# Patient Record
Sex: MALE | Race: WHITE
[De-identification: names, ages, dates, MRNs, and addresses within clinical notes are randomized per-mention and may not be internally consistent; named-entity substitution may affect disease eponyms.]

---

## 2019-07-25 ENCOUNTER — HOSPITAL ENCOUNTER (EMERGENCY)
Dept: HOSPITAL 72 - EMR | Age: 69
LOS: 1 days | Discharge: HOME | End: 2019-07-26
Payer: SELF-PAY

## 2019-07-25 VITALS — DIASTOLIC BLOOD PRESSURE: 83 MMHG | SYSTOLIC BLOOD PRESSURE: 119 MMHG

## 2019-07-25 VITALS — WEIGHT: 190 LBS | HEIGHT: 69 IN | BODY MASS INDEX: 28.14 KG/M2

## 2019-07-25 VITALS — SYSTOLIC BLOOD PRESSURE: 123 MMHG | DIASTOLIC BLOOD PRESSURE: 82 MMHG

## 2019-07-25 DIAGNOSIS — R07.89: Primary | ICD-10-CM

## 2019-07-25 DIAGNOSIS — E86.0: ICD-10-CM

## 2019-07-25 LAB
ADD MANUAL DIFF: NO
ALBUMIN SERPL-MCNC: 3.6 G/DL (ref 3.4–5)
ALBUMIN/GLOB SERPL: 0.9 {RATIO} (ref 1–2.7)
ALP SERPL-CCNC: 80 U/L (ref 46–116)
ALT SERPL-CCNC: 16 U/L (ref 12–78)
ANION GAP SERPL CALC-SCNC: 11 MMOL/L (ref 5–15)
AST SERPL-CCNC: 30 U/L (ref 15–37)
BASOPHILS NFR BLD AUTO: 1.1 % (ref 0–2)
BILIRUB SERPL-MCNC: 0.3 MG/DL (ref 0.2–1)
BUN SERPL-MCNC: 23 MG/DL (ref 7–18)
CALCIUM SERPL-MCNC: 8.9 MG/DL (ref 8.5–10.1)
CHLORIDE SERPL-SCNC: 107 MMOL/L (ref 98–107)
CK MB SERPL-MCNC: 2.9 NG/ML (ref 0–3.6)
CK SERPL-CCNC: 370 U/L (ref 26–308)
CO2 SERPL-SCNC: 22 MMOL/L (ref 21–32)
CREAT SERPL-MCNC: 1.2 MG/DL (ref 0.55–1.3)
EOSINOPHIL NFR BLD AUTO: 3.3 % (ref 0–3)
ERYTHROCYTE [DISTWIDTH] IN BLOOD BY AUTOMATED COUNT: 12.1 % (ref 11.6–14.8)
GLOBULIN SER-MCNC: 4 G/DL
HCT VFR BLD CALC: 33.1 % (ref 42–52)
HGB BLD-MCNC: 10.8 G/DL (ref 14.2–18)
LYMPHOCYTES NFR BLD AUTO: 17.4 % (ref 20–45)
MCV RBC AUTO: 86 FL (ref 80–99)
MONOCYTES NFR BLD AUTO: 8.7 % (ref 1–10)
NEUTROPHILS NFR BLD AUTO: 69.6 % (ref 45–75)
PLATELET # BLD: 242 K/UL (ref 150–450)
POTASSIUM SERPL-SCNC: 4.3 MMOL/L (ref 3.5–5.1)
RBC # BLD AUTO: 3.83 M/UL (ref 4.7–6.1)
SODIUM SERPL-SCNC: 140 MMOL/L (ref 136–145)
WBC # BLD AUTO: 7.2 K/UL (ref 4.8–10.8)

## 2019-07-25 PROCEDURE — 85025 COMPLETE CBC W/AUTO DIFF WBC: CPT

## 2019-07-25 PROCEDURE — 84484 ASSAY OF TROPONIN QUANT: CPT

## 2019-07-25 PROCEDURE — 36415 COLL VENOUS BLD VENIPUNCTURE: CPT

## 2019-07-25 PROCEDURE — 71045 X-RAY EXAM CHEST 1 VIEW: CPT

## 2019-07-25 PROCEDURE — 93005 ELECTROCARDIOGRAM TRACING: CPT

## 2019-07-25 PROCEDURE — 82550 ASSAY OF CK (CPK): CPT

## 2019-07-25 PROCEDURE — 85379 FIBRIN DEGRADATION QUANT: CPT

## 2019-07-25 PROCEDURE — 82553 CREATINE MB FRACTION: CPT

## 2019-07-25 PROCEDURE — 83690 ASSAY OF LIPASE: CPT

## 2019-07-25 PROCEDURE — 80307 DRUG TEST PRSMV CHEM ANLYZR: CPT

## 2019-07-25 PROCEDURE — 80053 COMPREHEN METABOLIC PANEL: CPT

## 2019-07-25 PROCEDURE — 83880 ASSAY OF NATRIURETIC PEPTIDE: CPT

## 2019-07-25 PROCEDURE — 99284 EMERGENCY DEPT VISIT MOD MDM: CPT

## 2019-07-25 NOTE — NUR
ED Nurse Note:

Patient biba RA 62 from [facility], patient states that he was walking, and started 
experiencinng left sided chest pain 4/10 pain radiating to his left arm. 
patient was given 2 nitro and 162 aspirin via EMS. Pt is AO x 4times, VSS, on 
room air no distress. DEVIKAD seen Pt at bedside.

## 2019-07-25 NOTE — EMERGENCY ROOM REPORT
History of Present Illness


General


Chief Complaint:  Chest Pain


Source:  Patient





Present Illness


HPI


Is a 68-year-old male who presented after increased chest discomfort.  Patient 

reports having onset of symptoms while attempting to run toward a bus.  He 

states that he had onset of symptoms earlier in the day as well.  He denies any 

prior medical history.  He states that he had some relief after second 

nitroglycerin.  Patient had onset of symptoms intermittently in nature.Patient 

states that he runs approximately 5 miles per day.  He states he had some chest 

discomfort after running early in the day.


Allergies:  


Coded Allergies:  


     No Known Allergies (Unverified , 7/25/19)





Patient History


Past Medical History:  see triage record


Reviewed Nursing Documentation:  PMH: Agreed; PSxH: Agreed





Nursing Documentation-PMH


Past Medical History:  No Stated History





Review of Systems


All Other Systems:  negative except mentioned in HPI





Physical Exam





Vital Signs








  Date Time  Temp Pulse Resp B/P (MAP) Pulse Ox O2 Delivery O2 Flow Rate FiO2


 


7/25/19 19:18 98.2 79 18 120/74 (89) 95 Room Air  








Sp02 EP Interpretation:  reviewed, normal


General Appearance:  normal inspection, well appearing, no apparent distress, 

alert, GCS 15, obese


Head:  atraumatic


ENT:  normal ENT inspection, hearing grossly normal, normal voice, other - poor 

dentition


Neck:  normal inspection, full range of motion, supple, no bony tend


Respiratory:  normal inspection, lungs clear, normal breath sounds, no 

respiratory distress, no retraction, no wheezing


Cardiovascular #1:  regular rate, rhythm, no edema


Gastrointestinal:  normal inspection, normal bowel sounds, non tender, soft, no 

guarding, no hernia


Genitourinary:  no CVA tenderness


Musculoskeletal:  normal inspection, back normal, normal range of motion


Neurologic:  normal inspection, alert, oriented x3, responsive, CNs III-XII nml 

as tested, speech normal


Psychiatric:  normal inspection, judgement/insight normal, mood/affect normal





Medical Decision Making


Diagnostic Impression:  


 Primary Impression:  


 Chest pain


 Additional Impression:  


 Dehydration


ER Course


Patient presented for chest pain.  Differential diagnosis include was not 

limited to anemia, pneumonia, pneumothorax, myocardial infarction, among 

others.  Because of complexity of patient's case laboratory testing and imaging 

studies were ordered.  Patient was noted to have unremarkable EKG. patient's 

laboratory testing was unremarkable except for some elevation of his BUN/

creatinine because with dehydration.  Troponin was noted to be negative.  

Patient was noted to be in no acute distress and have no significant evidence 

of acute coronary syndrome at this time.  Patient was noted to have EKG leads 

likely from prior hospitalization which were present prior to EMS arrival.  

Patient's noted to have some significant number of shopping bags with him.  

Patient was noted to have no significant abnormalities on chest x-ray.  Patient 

will be discharged home.  Patient advised outpatient cardiology follow-up.  

Patient was advised to return if worse.





Labs








Test


  7/25/19


19:30 7/25/19


19:50 7/25/19


21:15


 


Sodium Level


  140 MMOL/L


(136-145) 


  


 


 


Potassium Level


  4.3 MMOL/L


(3.5-5.1) 


  


 


 


Chloride Level


  107 MMOL/L


() 


  


 


 


Carbon Dioxide Level


  22 MMOL/L


(21-32) 


  


 


 


Anion Gap


  11 mmol/L


(5-15) 


  


 


 


Blood Urea Nitrogen


  23 mg/dL


(7-18) 


  


 


 


Creatinine


  1.2 MG/DL


(0.55-1.30) 


  


 


 


Estimat Glomerular Filtration


Rate > 60 mL/min


(>60) 


  


 


 


Glucose Level


  112 MG/DL


() 


  


 


 


Calcium Level


  8.9 MG/DL


(8.5-10.1) 


  


 


 


Total Bilirubin


  0.3 MG/DL


(0.2-1.0) 


  


 


 


Aspartate Amino Transf


(AST/SGOT) 30 U/L (15-37) 


  


  


 


 


Alanine Aminotransferase


(ALT/SGPT) 16 U/L (12-78) 


  


  


 


 


Alkaline Phosphatase


  80 U/L


() 


  


 


 


Total Creatine Kinase


  370 U/L


() 


  


 


 


Creatine Kinase MB


  2.9 NG/ML


(0.0-3.6) 


  


 


 


Creatine Kinase MB Relative


Index 0.7 


  


  


 


 


Troponin I


  0.000 ng/mL


(0.000-0.056) 


  


 


 


Pro-B-Type Natriuretic Peptide


  45 pg/mL


(0-125) 


  


 


 


Total Protein


  7.6 G/DL


(6.4-8.2) 


  


 


 


Albumin


  3.6 G/DL


(3.4-5.0) 


  


 


 


Globulin 4.0 g/dL   


 


Albumin/Globulin Ratio 0.9 (1.0-2.7)   


 


Lipase


  156 U/L


() 


  


 


 


White Blood Count


  


  7.2 K/UL


(4.8-10.8) 


 


 


Red Blood Count


  


  3.83 M/UL


(4.70-6.10) 


 


 


Hemoglobin


  


  10.8 G/DL


(14.2-18.0) 


 


 


Hematocrit


  


  33.1 %


(42.0-52.0) 


 


 


Mean Corpuscular Volume  86 FL (80-99)  


 


Mean Corpuscular Hemoglobin


  


  28.3 PG


(27.0-31.0) 


 


 


Mean Corpuscular Hemoglobin


Concent 


  32.8 G/DL


(32.0-36.0) 


 


 


Red Cell Distribution Width


  


  12.1 %


(11.6-14.8) 


 


 


Platelet Count


  


  242 K/UL


(150-450) 


 


 


Mean Platelet Volume


  


  6.2 FL


(6.5-10.1) 


 


 


Neutrophils (%) (Auto)


  


  69.6 %


(45.0-75.0) 


 


 


Lymphocytes (%) (Auto)


  


  17.4 %


(20.0-45.0) 


 


 


Monocytes (%) (Auto)


  


  8.7 %


(1.0-10.0) 


 


 


Eosinophils (%) (Auto)


  


  3.3 %


(0.0-3.0) 


 


 


Basophils (%) (Auto)


  


  1.1 %


(0.0-2.0) 


 


 


D-Dimer


  


  0.43 mg/L FEU


(0.00-0.49) 


 


 


Urine Opiates Screen


  


  


  Negative


(NEGATIVE)


 


Urine Barbiturates Screen


  


  


  Negative


(NEGATIVE)


 


Phencyclidine (PCP) Screen


  


  


  Negative


(NEGATIVE)


 


Urine Amphetamines Screen


  


  


  Negative


(NEGATIVE)


 


Urine Benzodiazepines Screen


  


  


  Negative


(NEGATIVE)


 


Urine Cocaine Screen


  


  


  Negative


(NEGATIVE)


 


Urine Marijuana (THC) Screen


  


  


  Negative


(NEGATIVE)








EKG Diagnostic Results


Rate:  normal


Rhythm:  NSR


ST Segments:  no acute changes





Last Vital Signs








  Date Time  Temp Pulse Resp B/P (MAP) Pulse Ox O2 Delivery O2 Flow Rate FiO2


 


7/25/19 19:18 98.2 79 18 120/74 (89) 95 Room Air  








Status:  improved


Disposition:  HOME, SELF-CARE


Condition:  Stable


Scripts


Aspirin* (ASPIRIN*) 325 Mg Tablet


325 MG ORAL DAILY, #30 TAB


   Prov: Cheikh Kwok MD         7/25/19











Cheikh Kwok MD Jul 25, 2019 19:46

## 2019-07-26 NOTE — DIAGNOSTIC IMAGING REPORT
Indication: Shortness of breath

 

Technique: One view of the chest

 

Comparison: none

 

Findings: Inspiration is suboptimal. There is crowding of the bronchovascular

markings. Lungs and pleural spaces are otherwise clear. The heart size is upper

limits normal. The aorta is tortuous

 

Impression: No acute process